# Patient Record
(demographics unavailable — no encounter records)

---

## 2025-01-08 NOTE — DISCUSSION/SUMMARY
[de-identified] : Chief complaint: Left index finger injury  HPI: Patient is a 7-year-old right-hand-dominant female who presents to the office today accompanied by her father for the evaluation of an injury to the left index finger which was sustained on 12/26/2024.  Patient's father reports that the patient's left index finger was slammed/stuck in a door.  She experienced pain and was brought to [facility] urgent care where the father reports that x-rays were taken.  They were told that the patient did not have a fracture.  The patient was placed in an AlumaFoam splint.  The father brought the patient in to the office today for repeat evaluation secondary to ongoing pain to the patient's left index finger.  The patient's father reports that the patient has been wearing the AlumaFoam splint consistently during the day.  The splint has only been removed at night.  ROS: Positive for left second finger pain/injury  Physical examination of the left second finger:  There is edema and mild ecchymosis noted most prominently over the PIP, middle phalanx, DIP There is no erythema Skin appears intact Patient is able to flex and extend at the DIP, PIP, and MP joints There is limited range of motion with flexion at the DIP, and PIP joints There is no appreciable laxity with ulnar or radial stress testing at the DIP, PIP, or MP joints There is tenderness to palpation over the PIP, middle phalanx, DIP There is no tenderness over the distal phalanx, proximal phalanx, MP joint, or second metacarpal Distal sensation is grossly intact to light touch Capillary refills less than 2 seconds  Three-view x-rays of the left second finger performed in the office today show no obvious acute displaced fracture, subluxation, or dislocation  Assessment/plan: Injury of left index finger, high degree of suspicion for contusion and/or sprain of the left index finger, discussed with the patient's father that these injuries typically take 4-6 weeks to heal/resolve, discussed treatment options  1.  I recommended discontinuation of the AlumaFoam splint for the left index finger, at this time I recommend transitioning to buddy taping the left second and third fingers, I showed the patient's father how to buddy tape the fingers together, I did recommend that the patient move her fingers while buddy taped, the buddy tape can be removed at night and for bathing 2.  Ice can be applied to the affected digit on an as needed basis with sensory precautions 3.  Discussed activity modifications with the patient and with her father, at this time I recommend no gym or sports   the patient will be provided with a 2-week follow-up with RASHEED Carrington for repeat evaluation, patient's father verbalized understanding of all findings in the office today, they agree to follow-up as directed